# Patient Record
Sex: FEMALE | Race: WHITE
[De-identification: names, ages, dates, MRNs, and addresses within clinical notes are randomized per-mention and may not be internally consistent; named-entity substitution may affect disease eponyms.]

---

## 2018-12-31 ENCOUNTER — HOSPITAL ENCOUNTER (EMERGENCY)
Dept: HOSPITAL 33 - ED | Age: 33
Discharge: HOME | End: 2018-12-31
Payer: COMMERCIAL

## 2018-12-31 VITALS — HEART RATE: 75 BPM | OXYGEN SATURATION: 97 %

## 2018-12-31 VITALS — DIASTOLIC BLOOD PRESSURE: 82 MMHG | SYSTOLIC BLOOD PRESSURE: 119 MMHG

## 2018-12-31 DIAGNOSIS — J02.0: Primary | ICD-10-CM

## 2018-12-31 PROCEDURE — 96372 THER/PROPH/DIAG INJ SC/IM: CPT

## 2018-12-31 PROCEDURE — 99284 EMERGENCY DEPT VISIT MOD MDM: CPT

## 2018-12-31 PROCEDURE — 87651 STREP A DNA AMP PROBE: CPT

## 2018-12-31 NOTE — ERPHSYRPT
- History of Present Illness


Time Seen by Provider: 12/31/18 14:19


Source: patient


Exam Limitations: no limitations


Patient Subjective Stated Complaint: pt here for a sore throat since yesterday. 

no fever,


Triage Nursing Assessment: pt alert, walked in, resp easy, skin w/d/p. no edema

, has red throat


Physician History: 





The patient is a 33-year-old female complaining of a worsening sore throat 

since yesterday.  She denies fever.  It is painful for her to swallow.  Her son 

just finished his course of antibiotics for strep throat.  She has no local 

doctor.


Timing/Duration: gradual onset, yesterday


Severity: moderate


ENT Location: throat


Prearrival Treatment: no prearrival treatment


Modifying Factors: Improves With: nothing


Associated Symptoms: sore throat, difficulty swallowing, No ear pain (R), No 

ear pain (L), No cough, No fever


Allergies/Adverse Reactions: 








No Known Drug Allergies Allergy (Unverified 12/31/18 13:28)


 





Home Medications: 








No Reportable Medications [No Reported Medications]  12/31/18 [History]





Hx Influenza Vaccination/Date Given: No


Hx Pneumococcal Vaccination/Date Given: No


Immunizations Up to Date: Yes





- Review of Systems


Constitutional: No Symptoms, No Fever


Eyes: No Symptoms


Ears, Nose, & Throat: Throat Pain, Painful Swallowing


Respiratory: No Cough, No Dyspnea


Cardiac: No Chest Pain, No Edema, No Syncope


Abdominal/Gastrointestinal: No Abdominal Pain, No Nausea, No Vomiting, No 

Diarrhea


Genitourinary Symptoms: No Dysuria


Musculoskeletal: No Back Pain, No Neck Pain


Skin: No Rash


Neurological: No Dizziness, No Focal Weakness, No Sensory Changes


Psychological: No Symptoms


Endocrine: No Symptoms


Hematologic/Lymphatic: No Symptoms


Immunological/Allergic: No Symptoms


All Other Systems: Reviewed and Negative





- Past Medical History


Pertinent Past Medical History: No





- Past Surgical History


Past Surgical History: No





- Social History


Smoking Status: Current every day smoker


Exposure to second hand smoke: No


Drug Use: none


Patient Lives Alone: No





- Female History


Hx Last Menstrual Period: nov 2018


Hx Pregnant Now: No





- Nursing Vital Signs


Nursing Vital Signs: 


 Initial Vital Signs











Temperature  98.8 F   12/31/18 13:24


 


Pulse Rate  87   12/31/18 13:24


 


Respiratory Rate  16   12/31/18 13:24


 


Blood Pressure  117/88   12/31/18 13:24


 


O2 Sat by Pulse Oximetry  99   12/31/18 13:24








 Pain Scale











Pain Intensity                 7

















- Physical Exam


General Appearance: mild distress


Eye Exam: bilateral eye: PERRL


Ear Exam: bilateral ear: canal normal


Nasal Exam: normal inspection


Throat Exam: tonsillar exudate, tonsillar swelling


Neck Exam: supple


Cardiovascular/Respiratory Exam: normal breath sounds, regular rate/rhythm


Abdominal Exam: non-tender, soft


Neurologic Exam: alert, oriented x 3, sensation nml, No motor deficits


Skin Exam: normal color, warm, dry


**SpO2 Interpretation**: normal


SpO2: 98


Oxygen Delivery: Room Air


Ordered Tests: 


Medication Summary














Discontinued Medications














Generic Name Dose Route Start Last Admin





  Trade Name Elham  PRN Reason Stop Dose Admin


 


Ketorolac Tromethamine  60 mg  12/31/18 14:22  12/31/18 15:00





  Toradol 30 Mg Injection***  IM  12/31/18 14:23  60 mg





  [facility]   Administration





     





     





     





     


 


Ketorolac Tromethamine  Confirm  12/31/18 14:58  





  Toradol 30 Mg Injection***  Administered  12/31/18 14:59  





  Dose   





  60 mg   





  .ROUTE   





  [employer]   





     





     





     





     











Lab/Rad Data: 


 Laboratory Results











  12/31/18 Range/Units





  14:40 


 


Group A Strep Antibody  POSITIVE  (NEGATIVE)  














- Progress


Progress: improved


Counseled pt/family regarding: lab results, diagnosis





- Departure


Time of Disposition: 16:29


Departure Disposition: Home


Clinical Impression: 


 Strep pharyngitis





Condition: Stable


Critical Care Time: No


Referrals: 


DOCTOR,NO FAMILY [Primary Care Provider] - 


Additional Instructions: 


You have strep throat.  You were given Toradol 60 mg by IM in the ER.  You can 

continue with warm salt water gargles as needed.  Take Tylenol and ibuprofen as 

needed.  Take penicillin 500 mg 4 times a day for 10 days.  You are considered 

infectious for the first 24 hours after beginning the antibiotic.  Follow-up 

with your primary medical doctor as needed.


Prescriptions: 


Penicillin V Potassium 500 mg PO QID #40 tablet